# Patient Record
Sex: FEMALE | Race: WHITE | NOT HISPANIC OR LATINO | Employment: FULL TIME | URBAN - METROPOLITAN AREA
[De-identification: names, ages, dates, MRNs, and addresses within clinical notes are randomized per-mention and may not be internally consistent; named-entity substitution may affect disease eponyms.]

---

## 2023-02-23 PROBLEM — H93.11 TINNITUS, RIGHT EAR: Status: ACTIVE | Noted: 2023-02-23

## 2023-02-23 PROBLEM — R53.83 FATIGUE: Status: ACTIVE | Noted: 2023-02-23

## 2023-02-23 PROBLEM — R73.03 PRE-DIABETES: Status: ACTIVE | Noted: 2023-02-23

## 2023-02-23 PROBLEM — R16.0 HEPATOMEGALY: Status: ACTIVE | Noted: 2023-02-23

## 2023-02-23 PROBLEM — R10.11 ABDOMINAL PAIN, RUQ: Status: ACTIVE | Noted: 2023-02-23

## 2023-02-23 PROBLEM — R06.83 SNORING: Status: ACTIVE | Noted: 2023-02-23

## 2023-02-23 PROBLEM — F41.9 ANXIETY: Status: ACTIVE | Noted: 2023-02-23

## 2023-02-23 PROBLEM — R79.82 CRP ELEVATED: Status: ACTIVE | Noted: 2023-02-23

## 2023-02-23 PROBLEM — B37.2 CANDIDIASIS, INTERTRIGINOUS: Status: ACTIVE | Noted: 2023-02-23

## 2023-02-23 PROBLEM — K76.0 FATTY LIVER: Status: ACTIVE | Noted: 2023-02-23

## 2023-02-23 PROBLEM — R79.89 ELEVATED LFTS: Status: ACTIVE | Noted: 2023-02-23

## 2023-02-23 PROBLEM — H90.41 SENSORINEURAL HEARING LOSS (SNHL) OF RIGHT EAR WITH UNRESTRICTED HEARING OF LEFT EAR: Status: ACTIVE | Noted: 2023-02-23

## 2023-02-23 PROBLEM — G47.9 SLEEP DISTURBANCE: Status: ACTIVE | Noted: 2023-02-23

## 2023-02-23 PROBLEM — D75.839 THROMBOCYTOSIS: Status: ACTIVE | Noted: 2023-02-23

## 2023-02-23 PROBLEM — H93.8X1 PRESSURE SENSATION IN RIGHT EAR: Status: ACTIVE | Noted: 2023-02-23

## 2023-02-23 PROBLEM — E66.9 CLASS 1 OBESITY WITH BODY MASS INDEX (BMI) OF 34.0 TO 34.9 IN ADULT: Status: ACTIVE | Noted: 2023-02-23

## 2023-02-23 PROBLEM — E78.5 HLD (HYPERLIPIDEMIA): Status: ACTIVE | Noted: 2023-02-23

## 2023-02-23 PROBLEM — E28.2 PCOS (POLYCYSTIC OVARIAN SYNDROME): Status: ACTIVE | Noted: 2023-02-23

## 2023-02-23 PROBLEM — J30.9 ALLERGIC RHINITIS: Status: ACTIVE | Noted: 2023-02-23

## 2023-02-23 RX ORDER — CHOLECALCIFEROL (VITAMIN D3) 25 MCG
1 TABLET ORAL DAILY
COMMUNITY
Start: 2020-12-21

## 2023-02-23 RX ORDER — BUSPIRONE HYDROCHLORIDE 10 MG/1
TABLET ORAL
COMMUNITY
End: 2023-03-14 | Stop reason: SDUPTHER

## 2023-03-13 ASSESSMENT — ENCOUNTER SYMPTOMS
UNEXPECTED WEIGHT CHANGE: 0
DIARRHEA: 0
CONSTIPATION: 0
CHEST TIGHTNESS: 0
ACTIVITY CHANGE: 0
NAUSEA: 0
PALPITATIONS: 0
BLOOD IN STOOL: 0
APPETITE CHANGE: 0
SHORTNESS OF BREATH: 0
ABDOMINAL PAIN: 0
VOMITING: 0

## 2023-03-14 ENCOUNTER — OFFICE VISIT (OUTPATIENT)
Dept: PRIMARY CARE | Facility: CLINIC | Age: 43
End: 2023-03-14
Payer: COMMERCIAL

## 2023-03-14 VITALS
HEIGHT: 66 IN | DIASTOLIC BLOOD PRESSURE: 100 MMHG | HEART RATE: 102 BPM | BODY MASS INDEX: 37.48 KG/M2 | WEIGHT: 233.2 LBS | SYSTOLIC BLOOD PRESSURE: 160 MMHG | TEMPERATURE: 97.3 F

## 2023-03-14 DIAGNOSIS — F10.10 ALCOHOL ABUSE: ICD-10-CM

## 2023-03-14 DIAGNOSIS — R10.11 ABDOMINAL PAIN, RUQ: ICD-10-CM

## 2023-03-14 DIAGNOSIS — F41.9 ANXIETY: Primary | ICD-10-CM

## 2023-03-14 DIAGNOSIS — R79.89 ELEVATED LFTS: ICD-10-CM

## 2023-03-14 PROCEDURE — 99214 OFFICE O/P EST MOD 30 MIN: CPT | Performed by: FAMILY MEDICINE

## 2023-03-14 PROCEDURE — 1036F TOBACCO NON-USER: CPT | Performed by: FAMILY MEDICINE

## 2023-03-14 RX ORDER — ESCITALOPRAM OXALATE 10 MG/1
10 TABLET ORAL DAILY
COMMUNITY
End: 2023-03-14 | Stop reason: SDUPTHER

## 2023-03-14 RX ORDER — BUSPIRONE HYDROCHLORIDE 10 MG/1
10 TABLET ORAL DAILY
Qty: 90 TABLET | Refills: 0 | Status: SHIPPED | OUTPATIENT
Start: 2023-03-14 | End: 2023-05-01

## 2023-03-14 RX ORDER — ESCITALOPRAM OXALATE 10 MG/1
10 TABLET ORAL DAILY
Qty: 30 TABLET | Refills: 1 | Status: SHIPPED | OUTPATIENT
Start: 2023-03-14

## 2023-03-14 RX ORDER — BUSPIRONE HYDROCHLORIDE 10 MG/1
10 TABLET ORAL DAILY
Qty: 90 TABLET | Refills: 1 | Status: CANCELLED | OUTPATIENT
Start: 2023-03-14

## 2023-03-14 ASSESSMENT — PATIENT HEALTH QUESTIONNAIRE - PHQ9
1. LITTLE INTEREST OR PLEASURE IN DOING THINGS: NOT AT ALL
SUM OF ALL RESPONSES TO PHQ9 QUESTIONS 1 AND 2: 0
2. FEELING DOWN, DEPRESSED OR HOPELESS: NOT AT ALL

## 2023-03-14 NOTE — PROGRESS NOTES
Subjective   Patient ID: Lashaun Alatorre is a 42 y.o. female who presents for Alcohol Problem.  HPI  43 yo female presents for fu       Hx of anxiety- saw psychiatrist in pastPaxil, prozac zoloft effexor and didn't feel like they helped.   Used ativan in past very sparingly  Psych started her on buspar which helped in past but not as much now  Has been self medicating with alcohol  Used to drink a bottle of wine/day but changed to shots of whiskey several mo ago  Has 4-5 shots throughout day to keep her calm  Is under stress and feels like this keeps her calm  Moving back to [place] this summer  She does not get intoxicated  Has always been an anxious person  Mood has been ok  Has gone up to a wk without any alcohol- may have initial hot flashes but no seizures or major withdrawal issues.    hx of RUQ abd pain/elevated LFTs/fatty liver  Saw liver specialist recently  Advised wt loss and etoh cessation  Had neg HIDA scan    sees therapist  Back on Buspar 10mg at bedtime      Review of Systems   Constitutional:  Negative for activity change, appetite change and unexpected weight change.   Respiratory:  Negative for chest tightness and shortness of breath.    Cardiovascular:  Negative for chest pain, palpitations and leg swelling.   Gastrointestinal:  Negative for abdominal pain, blood in stool, constipation, diarrhea, nausea and vomiting.       Objective     Physical Exam  Vitals and nursing note reviewed.   Constitutional:       Appearance: Normal appearance. She is normal weight.   HENT:      Head: Normocephalic and atraumatic.      Right Ear: Tympanic membrane, ear canal and external ear normal.      Left Ear: Tympanic membrane, ear canal and external ear normal.      Nose: Nose normal.      Mouth/Throat:      Mouth: Mucous membranes are moist.      Pharynx: Oropharynx is clear.   Eyes:      Extraocular Movements: Extraocular movements intact.      Conjunctiva/sclera: Conjunctivae normal.      Pupils: Pupils are  "equal, round, and reactive to light.   Cardiovascular:      Rate and Rhythm: Normal rate and regular rhythm.   Pulmonary:      Effort: Pulmonary effort is normal.      Breath sounds: Normal breath sounds.   Abdominal:      General: Abdomen is flat. Bowel sounds are normal.      Palpations: Abdomen is soft.   Musculoskeletal:         General: Normal range of motion.      Cervical back: Neck supple.   Skin:     General: Skin is warm and dry.   Neurological:      General: No focal deficit present.      Mental Status: She is alert and oriented to person, place, and time.   Psychiatric:         Mood and Affect: Mood normal.         Behavior: Behavior normal.         Thought Content: Thought content normal.         Judgment: Judgment normal.         BP (!) 160/100   Pulse 102   Temp 36.3 °C (97.3 °F)   Ht 1.676 m (5' 6\")   Wt 106 kg (233 lb 3.2 oz)   BMI 37.64 kg/m²     Lab Results   Component Value Date    WBC 7.4 10/13/2022    HGB 14.5 10/13/2022    HCT 45.3 10/13/2022    MCV 95 10/13/2022     (H) 10/13/2022       Chemistry    Lab Results   Component Value Date/Time     10/13/2022 1305    K 5.0 10/13/2022 1305     10/13/2022 1305    CO2 27 10/13/2022 1305    BUN 12 10/13/2022 1305    CREATININE 0.76 10/13/2022 1305    Lab Results   Component Value Date/Time    CALCIUM 10.1 10/13/2022 1305    ALKPHOS 70 02/16/2023 0849    AST 34 02/16/2023 0849    ALT 45 02/16/2023 0849    BILITOT 0.3 02/16/2023 0849           Lab Results   Component Value Date    CHOL 246 (H) 02/16/2023    CHOL 212 (H) 12/21/2020    CHOL 196 06/30/2020     Lab Results   Component Value Date    HDL 44.2 02/16/2023    HDL 46.9 12/21/2020    HDL 42.0 06/30/2020     No results found for: LDLCALC  Lab Results   Component Value Date    TRIG 142 02/16/2023    TRIG 160 (H) 12/21/2020    TRIG 206 (H) 06/30/2020     No components found for: CHOLHDL    Assessment/Plan   Problem List Items Addressed This Visit          Nervous    Abdominal " pain, RUQ       Digestive    Elevated LFTs       Other    Anxiety - Primary    Relevant Medications    busPIRone (Buspar) 10 mg tablet    escitalopram (Lexapro) 10 mg tablet    Other Relevant Orders    Referral to Addiction Recovery Services     Other Visit Diagnoses       Alcohol abuse        Relevant Orders    Referral to Addiction Recovery Services        Reviewed recent note from liver specialist  Try adding lexapro  Cont buspar  Refer to addiction specialists  Fu with therapist  Encouraged gradual reduction in alcohol consumption.  Encouraged healthy diet and exercise  Monitor BP  Fu 6 wks or sooner if needed

## 2023-03-25 LAB — ETHYL GLUCURONIDE SCREEN: NEGATIVE NG/ML

## 2023-03-30 LAB
6-ACETYLMORPHINE: <25 NG/ML
7-AMINOCLONAZEPAM: <25 NG/ML
ALPHA-HYDROXYALPRAZOLAM: <25 NG/ML
ALPHA-HYDROXYMIDAZOLAM: <25 NG/ML
ALPRAZOLAM: <25 NG/ML
AMPHETAMINE (PRESENCE) IN URINE BY SCREEN METHOD: NORMAL
BARBITURATES PRESENCE IN URINE BY SCREEN METHOD: NORMAL
CANNABINOIDS IN URINE BY SCREEN METHOD: NORMAL
CHLORDIAZEPOXIDE: <25 NG/ML
CLONAZEPAM: <25 NG/ML
COCAINE (PRESENCE) IN URINE BY SCREEN METHOD: NORMAL
CODEINE: <50 NG/ML
CREATINE, URINE FOR DRUG: 90.4 MG/DL
DIAZEPAM: <25 NG/ML
DRUG SCREEN COMMENT URINE: NORMAL
EDDP: <25 NG/ML
FENTANYL CONFIRMATION, URINE: <2.5 NG/ML
HYDROCODONE: <25 NG/ML
HYDROMORPHONE: <25 NG/ML
LORAZEPAM: <25 NG/ML
METHADONE CONFIRMATION,URINE: <25 NG/ML
MIDAZOLAM: <25 NG/ML
MORPHINE URINE: <50 NG/ML
NORDIAZEPAM: <25 NG/ML
NORFENTANYL: <2.5 NG/ML
NORHYDROCODONE: <25 NG/ML
NOROXYCODONE: <25 NG/ML
O-DESMETHYLTRAMADOL: <50 NG/ML
OXAZEPAM: <25 NG/ML
OXYCODONE: <25 NG/ML
OXYMORPHONE: <25 NG/ML
PHENCYCLIDINE (PRESENCE) IN URINE BY SCREEN METHOD: NORMAL
TEMAZEPAM: <25 NG/ML
TRAMADOL: <50 NG/ML
ZOLPIDEM METABOLITE (ZCA): <25 NG/ML
ZOLPIDEM: <25 NG/ML

## 2023-04-04 ENCOUNTER — OFFICE VISIT (OUTPATIENT)
Dept: PRIMARY CARE | Facility: CLINIC | Age: 43
End: 2023-04-04
Payer: COMMERCIAL

## 2023-04-04 VITALS
HEART RATE: 90 BPM | TEMPERATURE: 97.9 F | SYSTOLIC BLOOD PRESSURE: 128 MMHG | HEIGHT: 67 IN | WEIGHT: 235 LBS | BODY MASS INDEX: 36.88 KG/M2 | DIASTOLIC BLOOD PRESSURE: 80 MMHG

## 2023-04-04 DIAGNOSIS — D75.839 THROMBOCYTOSIS: ICD-10-CM

## 2023-04-04 DIAGNOSIS — Z00.00 HEALTHCARE MAINTENANCE: Primary | ICD-10-CM

## 2023-04-04 DIAGNOSIS — N39.3 STRESS INCONTINENCE: ICD-10-CM

## 2023-04-04 PROCEDURE — 1036F TOBACCO NON-USER: CPT | Performed by: FAMILY MEDICINE

## 2023-04-04 PROCEDURE — 99396 PREV VISIT EST AGE 40-64: CPT | Performed by: FAMILY MEDICINE

## 2023-04-04 ASSESSMENT — ENCOUNTER SYMPTOMS
ACTIVITY CHANGE: 0
NAUSEA: 0
VOMITING: 0
UNEXPECTED WEIGHT CHANGE: 0
DIARRHEA: 0
SHORTNESS OF BREATH: 0
BLOOD IN STOOL: 0
ABDOMINAL PAIN: 0
CHEST TIGHTNESS: 0
CONSTIPATION: 0
PALPITATIONS: 0
APPETITE CHANGE: 0

## 2023-04-04 ASSESSMENT — PATIENT HEALTH QUESTIONNAIRE - PHQ9
SUM OF ALL RESPONSES TO PHQ9 QUESTIONS 1 AND 2: 0
1. LITTLE INTEREST OR PLEASURE IN DOING THINGS: NOT AT ALL
2. FEELING DOWN, DEPRESSED OR HOPELESS: NOT AT ALL

## 2023-04-04 NOTE — PROGRESS NOTES
"Subjective   Patient ID: Lashaun Alatorre is a 42 y.o. female who presents for Annual Exam (Sees GYN for exams.  She is not fasting for blood work today).  HPI  42 female presents for a physical and f/u .     BMI 36  No regular exercise but plans to start; trying to eat better  Hx HLD- lipids in feb with dr pryor were elevated.     sees gyn dr watts for exams  hx PCOS/prediabetes  Used to see  [facility]- dr almodovar-     hx of thrombocytosis-stable  saw heme in past.     She denies any chest pains, palpitations, edema, shortness of breath, abdominal pains, nausea, vomiting, diarrhea, constipation, blood in stool, melena.   hx occ reflux; takes otc prilosec or tums prn    sees optho; wears glasses;   sees dentist  no mole changes    flu shot- had  had covid shots and booster  tdap 2020         Hx of anxiety/alcohol abuse- was seen 3/14 and started on lexapro and was referred to  psych- started IOP for alcohol;  hasnt had a drink for 12 days.   Has been more emotional but feels it is helping   Has a lot going on- Moving back to [place] this summer  no seizures or major withdrawal issues.     hx of RUQ abd pain/elevated LFTs/fatty liver  Saw liver specialist   Had neg HIDA scan  Has another liver scan coming up     Co stress incontinence- with coughing and sneezing; tried kegels        Review of Systems   Constitutional:  Negative for activity change, appetite change and unexpected weight change.   Respiratory:  Negative for chest tightness and shortness of breath.    Cardiovascular:  Negative for chest pain, palpitations and leg swelling.   Gastrointestinal:  Negative for abdominal pain, blood in stool, constipation, diarrhea, nausea and vomiting.       Objective     /80   Pulse 90   Temp 36.6 °C (97.9 °F)   Ht 1.702 m (5' 7\")   Wt 107 kg (235 lb)   BMI 36.81 kg/m²     Physical Exam  Vitals and nursing note reviewed.   Constitutional:       Appearance: Normal appearance. She is normal weight.   HENT:      Head: " "Normocephalic and atraumatic.      Right Ear: Tympanic membrane, ear canal and external ear normal.      Left Ear: Tympanic membrane, ear canal and external ear normal.      Nose: Nose normal.      Mouth/Throat:      Mouth: Mucous membranes are moist.      Pharynx: Oropharynx is clear.   Eyes:      Extraocular Movements: Extraocular movements intact.      Conjunctiva/sclera: Conjunctivae normal.      Pupils: Pupils are equal, round, and reactive to light.   Cardiovascular:      Rate and Rhythm: Normal rate and regular rhythm.   Pulmonary:      Effort: Pulmonary effort is normal.      Breath sounds: Normal breath sounds.   Abdominal:      General: Abdomen is flat. Bowel sounds are normal.      Palpations: Abdomen is soft.   Musculoskeletal:         General: Normal range of motion.      Cervical back: Neck supple.   Skin:     General: Skin is warm and dry.   Neurological:      General: No focal deficit present.      Mental Status: She is alert and oriented to person, place, and time.   Psychiatric:         Mood and Affect: Mood normal.         Behavior: Behavior normal.         Thought Content: Thought content normal.         Judgment: Judgment normal.         /80   Pulse 90   Temp 36.6 °C (97.9 °F)   Ht 1.702 m (5' 7\")   Wt 107 kg (235 lb)   BMI 36.81 kg/m²     Lab Results   Component Value Date    WBC 7.4 10/13/2022    HGB 14.5 10/13/2022    HCT 45.3 10/13/2022    MCV 95 10/13/2022     (H) 10/13/2022       Chemistry    Lab Results   Component Value Date/Time     10/13/2022 1305    K 5.0 10/13/2022 1305     10/13/2022 1305    CO2 27 10/13/2022 1305    BUN 12 10/13/2022 1305    CREATININE 0.76 10/13/2022 1305    Lab Results   Component Value Date/Time    CALCIUM 10.1 10/13/2022 1305    ALKPHOS 70 02/16/2023 0849    AST 34 02/16/2023 0849    ALT 45 02/16/2023 0849    BILITOT 0.3 02/16/2023 0849           Lab Results   Component Value Date    CHOL 246 (H) 02/16/2023    CHOL 212 (H) " 12/21/2020    CHOL 196 06/30/2020     Lab Results   Component Value Date    HDL 44.2 02/16/2023    HDL 46.9 12/21/2020    HDL 42.0 06/30/2020     No results found for: LDLCALC  Lab Results   Component Value Date    TRIG 142 02/16/2023    TRIG 160 (H) 12/21/2020    TRIG 206 (H) 06/30/2020     No components found for: CHOLHDL    Assessment/Plan   Problem List Items Addressed This Visit          Genitourinary    Stress incontinence    Relevant Orders    Referral to Physical Therapy       Hematologic    Thrombocytosis (CMS/HCC)       Other    Healthcare maintenance - Primary    Relevant Orders    CBC    Comprehensive Metabolic Panel    Hemoglobin A1C    TSH with reflex to Free T4 if abnormal    Vitamin D, Total   Cont lexapro  Cont IOP and fu with psych  Encouraged continued alcohol cessation  Encouraged healthy diet and exercise  Monitor BP  Fu 6-8 wks or sooner if needed   check labs  Sees GYN for exams Dr. Schilling;   had covid shots and booster and had flu shot  tdap 2020  Fu with liver specialist  Refer to pelvic floor therapy for stress incont

## 2023-04-06 LAB — ETHYL GLUCURONIDE SCREEN: NEGATIVE NG/ML

## 2023-04-25 ENCOUNTER — APPOINTMENT (OUTPATIENT)
Dept: PRIMARY CARE | Facility: CLINIC | Age: 43
End: 2023-04-25
Payer: COMMERCIAL

## 2023-05-01 DIAGNOSIS — F41.9 ANXIETY: ICD-10-CM

## 2023-05-01 RX ORDER — BUSPIRONE HYDROCHLORIDE 10 MG/1
TABLET ORAL
Qty: 90 TABLET | Refills: 0 | Status: SHIPPED | OUTPATIENT
Start: 2023-05-01

## 2023-06-05 ENCOUNTER — APPOINTMENT (OUTPATIENT)
Dept: PRIMARY CARE | Facility: CLINIC | Age: 43
End: 2023-06-05
Payer: COMMERCIAL